# Patient Record
Sex: FEMALE | Race: WHITE | NOT HISPANIC OR LATINO
[De-identification: names, ages, dates, MRNs, and addresses within clinical notes are randomized per-mention and may not be internally consistent; named-entity substitution may affect disease eponyms.]

---

## 2019-11-29 ENCOUNTER — TRANSCRIBE ORDERS (OUTPATIENT)
Dept: OTHER | Age: 69
End: 2019-11-29

## 2019-11-29 DIAGNOSIS — R25.1 TREMOR: Primary | ICD-10-CM

## 2019-12-02 NOTE — TELEPHONE ENCOUNTER
RECORDS RECEIVED FROM: External - Dona Marion with the [facility]   DATE RECEIVED: 1/14/20   NOTES (FOR ALL VISITS) STATUS DETAILS   OFFICE NOTE from referring provider Received 6/21/16  5/10/16  3/29/16   OFFICE NOTE from other specialist Care Everywhere [facility]:  11/4/19   DISCHARGE SUMMARY from hospital N/A    DISCHARGE REPORT from the ER N/A    OPERATIVE REPORT N/A    MEDICATION LIST Care Everywhere    IMAGING  (FOR ALL VISITS)     EMG N/A    EEG N/A    ECT N/A    MRI (HEAD, NECK, SPINE) Received  [facility]:  MRI Brain 8/10/16  MRI Brain 4/29/14   LUMBAR PUNCTURE N/A    [facility] Scan N/A    CT (HEAD, NECK, SPINE) Received  [facility]:  CT Brain 6/27/13      Action 12/2/19   Action Taken Records request faxed to  [facility]

## 2019-12-03 ENCOUNTER — DOCUMENTATION ONLY (OUTPATIENT)
Dept: CARE COORDINATION | Facility: CLINIC | Age: 69
End: 2019-12-03

## 2019-12-03 NOTE — TELEPHONE ENCOUNTER
Action 12/3/19   Action Taken Records received from [facility] via fax. Sent to scanning.    Waiting for images

## 2019-12-04 NOTE — TELEPHONE ENCOUNTER
Imaging Received  12/4/19  [facility]   Image Type (x): Disc: x  PACS:    Exam Date/Name MRI Brain 8/10/16  MRI Brain 4/29/14  CT Brain 6/27/13 Comments: imaging disk received via mail. Sent to scanning

## 2020-01-03 PROBLEM — Z81.1: Status: ACTIVE | Noted: 2020-01-03

## 2020-01-03 PROBLEM — I10 HYPERTENSION: Status: ACTIVE | Noted: 2020-01-03

## 2020-01-03 PROBLEM — J45.909 ASTHMA: Status: ACTIVE | Noted: 2020-01-03

## 2020-01-03 PROBLEM — R25.1 TREMOR: Status: ACTIVE | Noted: 2020-01-03

## 2020-01-03 PROBLEM — K60.2 ANAL FISSURE: Status: ACTIVE | Noted: 2020-01-03

## 2020-01-03 PROBLEM — N80.9 ENDOMETRIOSIS: Status: ACTIVE | Noted: 2020-01-03

## 2020-01-03 PROBLEM — E11.9 DIABETES MELLITUS, TYPE 2 (H): Status: ACTIVE | Noted: 2020-01-03

## 2020-01-03 PROBLEM — A06.9: Status: ACTIVE | Noted: 2020-01-03

## 2020-01-03 PROBLEM — Z82.0 FAMILY HISTORY OF AMYOTROPHIC LATERAL SCLEROSIS: Status: ACTIVE | Noted: 2020-01-03

## 2020-01-03 RX ORDER — FUROSEMIDE 20 MG
10 TABLET ORAL DAILY
COMMUNITY
End: 2020-01-14

## 2020-01-03 RX ORDER — HYDROCHLOROTHIAZIDE 50 MG/1
TABLET ORAL
COMMUNITY
End: 2021-06-21

## 2020-01-03 RX ORDER — ALBUTEROL SULFATE 0.83 MG/ML
2.5 SOLUTION RESPIRATORY (INHALATION) 4 TIMES DAILY
COMMUNITY
End: 2020-01-14

## 2020-01-03 RX ORDER — LEVOTHYROXINE SODIUM 25 UG/1
TABLET ORAL
COMMUNITY

## 2020-01-03 RX ORDER — METOPROLOL SUCCINATE 200 MG/1
TABLET, EXTENDED RELEASE ORAL
COMMUNITY
End: 2020-01-14

## 2020-01-03 RX ORDER — ROSUVASTATIN CALCIUM 20 MG/1
TABLET, COATED ORAL
COMMUNITY
End: 2021-06-21

## 2020-01-03 RX ORDER — LISINOPRIL 20 MG/1
TABLET ORAL
COMMUNITY
End: 2021-09-23

## 2020-01-03 RX ORDER — DICYCLOMINE HYDROCHLORIDE 10 MG/1
CAPSULE ORAL
COMMUNITY
End: 2020-01-14

## 2020-01-03 NOTE — PROGRESS NOTES
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   Procedure Laterality Date     APPENDECTOMY     CHOLECYSTECTOMY     COLOSTOMY     HYSTERECTOMY     ILEOSTOMY     OOPHORECTOMY   unilateral     TONSILLECTOMY     Allergies   Allergen Reactions     Penicillin Hives     Primidone Delirium     Ampicillin Unknown Reaction     Ciprofloxacin Unknown Reaction     Latex Unknown Reaction     Sulfa (Sulfonamide Antibiotics) Unknown Reaction     Current Outpatient Medications   Medication Sig Dispense Refill     albuterol (AKA: PROVENTIL/VENTOLIN) 2.5 mg /3 mL (0.083 %) inhalation Solution for Nebulization 2.5 mg by inhalation-neb route four times daily.     blood sugar diagnostic (ACCU-CHEK INSTANT GLUCOSE TEST MISC) USE ONE STRIP FOR TESTING AS DIRECTED TWICE A DAY TO CHECK BLOOD SUGARS     cholecalciferol, Vitamin D3, 2,000 unit oral Tablet Take 2,000 Units by mouth once daily.     cyanocobalamin (AKA: VITAMIN B-12) 100 mcg oral Tablet Take 250 mcg by  mouth once daily.     dicyclomine (AKA: BENTYL) 10 mg oral Capsule Take 10 mcg by mouth five times daily as needed..     furosemide (AKA: LASIX) 10 mg oral Tablet Take 10 mg by mouth once daily.     glucose (AKA: BD-GLUCOSE) 4 gram oral Tablet, Chewable Chew and Swallow 4 mg by mouth three times daily as needed.     hydroCHLOROthiazide 50 mg oral Tablet Take 50 mg by mouth once daily.     levothyroxine (AKA: LEVOXYL, SYNTHROID) 25 mcg oral Tablet Take 25 mcg by mouth once daily.     lisinopril (AKA: PRINIVIL/ZESTRIL) 20 mg oral Tablet Take 20 mg by mouth twice daily.     metFORMIN (AKA: GLUCOPHAGE) 1,000 mg oral Tablet Take 1,000 mg by mouth twice daily.     metoprolol succinate (AKA: TOPROL XL) 200 mg oral Tablet Sustained Release 24HR Take 100 mg by mouth once daily.     rosuvastatin (CRESTOR) 40 mg oral Tablet Take 20 mg by mouth once daily.     No current facility-administered medications for this visit.     Social History     Socioeconomic History     Marital status:    Spouse name: Not on file     Number of children: Not on file     Years of education: Not on file     Highest education level: Not on file   Occupational History     Not on file   Social Needs     Financial resource strain: Not on file     Food insecurity:   Worry: Not on file   Inability: Not on file     Transportation needs:   Medical: Not on file   Non-medical: Not on file   Tobacco Use     Smoking status: Never Smoker     Smokeless tobacco: Never Used   Substance and Sexual Activity     Alcohol use: Not on file   Comment: no alcohol use     Drug use: Not on file     Sexual activity: Not on file   Lifestyle     Physical activity:   Days per week: Not on file   Minutes per session: Not on file     Stress: Not on file   Relationships     Social connections:   Talks on phone: Not on file   Gets together: Not on file   Attends Rastafarian service: Not on file   Active member of club or organization: Not on file   Attends meetings of clubs or  organizations: Not on file   Relationship status: Not on file     Intimate partner violence:   Fear of current or ex partner: Not on file   Emotionally abused: Not on file   Physically abused: Not on file   Forced sexual activity: Not on file   Other Topics Concern     Not on file   Social History Narrative     Not on file     Family History   Problem Relation Age of Onset     Other Father   amyotrophic lateral sclerosis     Other Sister   victim, motorcycle in vehicular or traffic accident     Alcohol/Drug problem Brother   alcoholism     ROS: A 12 point review of systems was completed and is pertinent for as stated in the HPI. I have nothing further to add at this time.    PHYSICAL EXAM     /76 (BP Source: L arm, BP position: Sitting)     GENERAL: Patient is awake and alert, in no acute distress, and nontoxic in appearance. Interacting appropriately and following commands.  HEENT: Normocephalic/atraumatic. There is no nuchal rigidity. Carotid upstrokes are symmetric without bruits. No masses present. No occipital notch tenderness. No tissue/texture changes in the paracervical or trapezius regions.  HEART: Regular rate and rhythm, no murmur.  LUNGS: Clear to auscultation bilaterally. No wheeze, rhonchi, rales, or stridor.  ABDOMEN: Soft, nontender and nondistended. Bowel sounds present.  EXTREMITIES: No clubbing, cyanosis, edema, or erythema evident. Distal pulses symmetrical.    NEUROLOGIC EXAM    MENTAL STATUS: The patient is awake, alert, and is oriented to all spheres. There is intact recent and remote memory. Registration is 3/3, recall is 3/3. Speech is fluent. There is no dysarthria, dysphasia or anomia. Fund of knowledge is adequate, and insight is intact.    CRANIAL NERVES: Fundi are difficult to visualize at this time. Visual fields are full to confrontation. Visual acuity is adequate bilaterally. Pupils are equal, round and briskly reactive to light. Extraocular movements are intact. There is no  nystagmus or gaze preference. Facial sensation is intact in all 3 distributions bilaterally. Muscles of mastication are full. There is no facial asymmetry. Hearing is intact to soft conversation. Soft palate elevation is symmetric. SCM and trapezius strength are full at 5/5 bilaterally. Tongue is midline without deviation or evidence of atrophy.    SENSATION: There is intact sensation to vibration, position and light touch in bilateral upper and lower extremities. There is no extinction.    MOTOR: There is no pronator drift. Strength in upper and lower extremities is full both proximally and distally at a 5/5. There is normal bulk and tone without atrophy. There is no clonus, rigidity or spasticity. There is a postural and kinetic tremor of the bilateral hands certainly right greater than left. The right hand there is a tremor at rest as well is fairly high amplitude. There is some rigidity on the right hand at the wrist although this may be secondary to the tremor.. There is no Lashaun sign.    CEREBELLAR: There is intact finger-nose-finger and rapid alternating movements. There is no dysmetria or dysdiadochokinesia.    REFLEXES: Deep tendon reflexes are symmetric in bilateral upper and lower extremities, graded 2/4.     GAIT: Gait is steady.    SUGAR Newton is a 69 Y female seen in a neurological evaluation regarding her history of tremors and questionable Parkinson's. Sounds as if she may have some REM sleep related issues but otherwise she has no other coexisting Parkinson's symptoms or signs. She does have a tremor both postural kinetic and at rest of the right upper extremity as well as the jaw.    PLAN  Discussed the above with her today in the office. To be certainly worthwhile to try her on Sinemet to see if there is any improvement however I really think that the best course of action would be to have her be evaluated by a movement specialist at the [facility]. She is agreeable to this  however will [facility] and therefore I am making these recommendations. I am hopeful that the [facility] will make this authorization and arrangement for her. She is agreeable to this.    Thank you for allowing me to participate in the care of your patient. Please do not hesitate to contact me with any questions or concerns.    1. Tremors of nervous system     No orders of the defined types were placed in this encounter.      CC: SHERICE Mike,CNP    Electronically signed by Sobia Villegas DO at 11/04/2019 10:41 AM CST

## 2020-01-14 ENCOUNTER — PRE VISIT (OUTPATIENT)
Dept: NEUROLOGY | Facility: CLINIC | Age: 70
End: 2020-01-14

## 2020-01-14 ENCOUNTER — OFFICE VISIT (OUTPATIENT)
Dept: NEUROLOGY | Facility: CLINIC | Age: 70
End: 2020-01-14
Attending: NURSE PRACTITIONER
Payer: COMMERCIAL

## 2020-01-14 VITALS
HEART RATE: 88 BPM | WEIGHT: 213.7 LBS | SYSTOLIC BLOOD PRESSURE: 147 MMHG | OXYGEN SATURATION: 97 % | DIASTOLIC BLOOD PRESSURE: 76 MMHG

## 2020-01-14 DIAGNOSIS — Z81.1: ICD-10-CM

## 2020-01-14 DIAGNOSIS — I10 ESSENTIAL HYPERTENSION: ICD-10-CM

## 2020-01-14 DIAGNOSIS — A06.9: ICD-10-CM

## 2020-01-14 DIAGNOSIS — N80.9 ENDOMETRIOSIS: ICD-10-CM

## 2020-01-14 DIAGNOSIS — R25.1 TREMOR: ICD-10-CM

## 2020-01-14 DIAGNOSIS — Z87.820 HISTORY OF CONCUSSION: ICD-10-CM

## 2020-01-14 DIAGNOSIS — Z82.0 FAMILY HISTORY OF AMYOTROPHIC LATERAL SCLEROSIS: ICD-10-CM

## 2020-01-14 DIAGNOSIS — K60.2 ANAL FISSURE: ICD-10-CM
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
## 2020-01-14 RX ORDER — GLIPIZIDE 10 MG/1
TABLET ORAL
COMMUNITY
End: 2021-06-21

## 2020-01-14 RX ORDER — LORATADINE 10 MG/1
TABLET ORAL
COMMUNITY
End: 2021-06-17

## 2020-01-14 RX ORDER — METOPROLOL SUCCINATE 50 MG/1
TABLET, EXTENDED RELEASE ORAL
COMMUNITY
End: 2021-06-21

## 2020-01-14 RX ORDER — DULOXETIN HYDROCHLORIDE 60 MG/1
CAPSULE, DELAYED RELEASE ORAL
COMMUNITY
End: 2021-06-17

## 2020-01-14 RX ORDER — GUAIFENESIN 400 MG/1
TABLET ORAL
COMMUNITY
End: 2021-06-17

## 2020-01-14 RX ORDER — TRAZODONE HYDROCHLORIDE 50 MG/1
TABLET, FILM COATED ORAL
COMMUNITY
End: 2021-06-16

## 2020-01-14 RX ORDER — INSULIN GLARGINE 100 [IU]/ML
INJECTION, SOLUTION SUBCUTANEOUS
COMMUNITY
End: 2021-06-17

## 2020-01-14 RX ORDER — ALBUTEROL SULFATE 0.83 MG/ML
SOLUTION RESPIRATORY (INHALATION)
COMMUNITY
Start: 2020-01-14 | End: 2021-09-09

## 2020-01-14 RX ORDER — FUROSEMIDE 20 MG
TABLET ORAL
COMMUNITY
Start: 2020-01-14 | End: 2021-06-16

## 2020-01-14 ASSESSMENT — ENCOUNTER SYMPTOMS
DISTURBANCES IN COORDINATION: 0
NUMBNESS: 0
TINGLING: 0
LOSS OF CONSCIOUSNESS: 0
HEADACHES: 0
DIZZINESS: 0
PARALYSIS: 0
TREMORS: 1
MEMORY LOSS: 0
WEAKNESS: 0
SPEECH CHANGE: 0
SEIZURES: 0

## 2020-01-14 ASSESSMENT — PAIN SCALES - GENERAL: PAINLEVEL: NO PAIN (0)

## 2020-01-14 NOTE — NURSING NOTE
Chief Complaint   Patient presents with     Consult For     P NEW MOVEMENT DISORDER - TREMORS      Tremors     Nicole Cortez, EMT
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
## 2020-01-14 NOTE — LETTER
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     History of anal fissures     History of diabetes mellitus, type II     History of hypertension     Personal history of asthma     Personal history of endometriosis     Tremor, essential     Past Surgical History:   Procedure Laterality Date     APPENDECTOMY     CHOLECYSTECTOMY     COLOSTOMY     HYSTERECTOMY     ILEOSTOMY     OOPHORECTOMY   unilateral     TONSILLECTOMY     Allergies   Allergen Reactions     Penicillin Hives     Primidone Delirium     Ampicillin Unknown Reaction     Ciprofloxacin Unknown Reaction     Latex Unknown Reaction     Sulfa (Sulfonamide Antibiotics) Unknown Reaction     Current Outpatient Medications   Medication Sig Dispense Refill     albuterol (AKA: PROVENTIL/VENTOLIN) 2.5 mg /3 mL (0.083 %) inhalation Solution for Nebulization 2.5 mg by inhalation-neb route four times daily.     blood sugar diagnostic (ACCU-CHEK INSTANT GLUCOSE  TEST MISC) USE ONE STRIP FOR TESTING AS DIRECTED TWICE A DAY TO CHECK BLOOD SUGARS     cholecalciferol, Vitamin D3, 2,000 unit oral Tablet Take 2,000 Units by mouth once daily.     cyanocobalamin (AKA: VITAMIN B-12) 100 mcg oral Tablet Take 250 mcg by mouth once daily.     dicyclomine (AKA: BENTYL) 10 mg oral Capsule Take 10 mcg by mouth five times daily as needed..     furosemide (AKA: LASIX) 10 mg oral Tablet Take 10 mg by mouth once daily.     glucose (AKA: BD-GLUCOSE) 4 gram oral Tablet, Chewable Chew and Swallow 4 mg by mouth three times daily as needed.     hydroCHLOROthiazide 50 mg oral Tablet Take 50 mg by mouth once daily.     levothyroxine (AKA: LEVOXYL, SYNTHROID) 25 mcg oral Tablet Take 25 mcg by mouth once daily.     lisinopril (AKA: PRINIVIL/ZESTRIL) 20 mg oral Tablet Take 20 mg by mouth twice daily.     metFORMIN (AKA: GLUCOPHAGE) 1,000 mg oral Tablet Take 1,000 mg by mouth twice daily.     metoprolol succinate (AKA: TOPROL XL) 200 mg oral Tablet Sustained Release 24HR Take 100 mg by mouth once daily.     rosuvastatin (CRESTOR) 40 mg oral Tablet Take 20 mg by mouth once daily.     No current facility-administered medications for this visit.     Social History     Socioeconomic History     Marital status:    Spouse name: Not on file     Number of children: Not on file     Years of education: Not on file     Highest education level: Not on file   Occupational History     Not on file   Social Needs     Financial resource strain: Not on file     Food insecurity:   Worry: Not on file   Inability: Not on file     Transportation needs:   Medical: Not on file   Non-medical: Not on file   Tobacco Use     Smoking status: Never Smoker     Smokeless tobacco: Never Used   Substance and Sexual Activity     Alcohol use: Not on file   Comment: no alcohol use     Drug use: Not on file     Sexual activity: Not on file   Lifestyle     Physical activity:   Days per week: Not on file   Minutes per session: Not on  file     Stress: Not on file   Relationships     Social connections:   Talks on phone: Not on file   Gets together: Not on file   Attends Taoism service: Not on file   Active member of club or organization: Not on file   Attends meetings of clubs or organizations: Not on file   Relationship status: Not on file     Intimate partner violence:   Fear of current or ex partner: Not on file   Emotionally abused: Not on file   Physically abused: Not on file   Forced sexual activity: Not on file   Other Topics Concern     Not on file   Social History Narrative     Not on file     Family History   Problem Relation Age of Onset     Other Father   amyotrophic lateral sclerosis     Other Sister   victim, motorcycle in vehicular or traffic accident     Alcohol/Drug problem Brother   alcoholism     ROS: A 12 point review of systems was completed and is pertinent for as stated in the HPI. I have nothing further to add at this time.    PHYSICAL EXAM     /76 (BP Source: L arm, BP position: Sitting)     GENERAL: Patient is awake and alert, in no acute distress, and nontoxic in appearance. Interacting appropriately and following commands.  HEENT: Normocephalic/atraumatic. There is no nuchal rigidity. Carotid upstrokes are symmetric without bruits. No masses present. No occipital notch tenderness. No tissue/texture changes in the paracervical or trapezius regions.  HEART: Regular rate and rhythm, no murmur.  LUNGS: Clear to auscultation bilaterally. No wheeze, rhonchi, rales, or stridor.  ABDOMEN: Soft, nontender and nondistended. Bowel sounds present.  EXTREMITIES: No clubbing, cyanosis, edema, or erythema evident. Distal pulses symmetrical.    NEUROLOGIC EXAM    MENTAL STATUS: The patient is awake, alert, and is oriented to all spheres. There is intact recent and remote memory. Registration is 3/3, recall is 3/3. Speech is fluent. There is no dysarthria, dysphasia or anomia. Fund of knowledge is adequate, and insight is  intact.    CRANIAL NERVES: Fundi are difficult to visualize at this time. Visual fields are full to confrontation. Visual acuity is adequate bilaterally. Pupils are equal, round and briskly reactive to light. Extraocular movements are intact. There is no nystagmus or gaze preference. Facial sensation is intact in all 3 distributions bilaterally. Muscles of mastication are full. There is no facial asymmetry. Hearing is intact to soft conversation. Soft palate elevation is symmetric. SCM and trapezius strength are full at 5/5 bilaterally. Tongue is midline without deviation or evidence of atrophy.    SENSATION: There is intact sensation to vibration, position and light touch in bilateral upper and lower extremities. There is no extinction.    MOTOR: There is no pronator drift. Strength in upper and lower extremities is full both proximally and distally at a 5/5. There is normal bulk and tone without atrophy. There is no clonus, rigidity or spasticity. There is a postural and kinetic tremor of the bilateral hands certainly right greater than left. The right hand there is a tremor at rest as well is fairly high amplitude. There is some rigidity on the right hand at the wrist although this may be secondary to the tremor.. There is no Lashaun sign.    CEREBELLAR: There is intact finger-nose-finger and rapid alternating movements. There is no dysmetria or dysdiadochokinesia.    REFLEXES: Deep tendon reflexes are symmetric in bilateral upper and lower extremities, graded 2/4.     GAIT: Gait is steady.    SUGAR Newton is a 69 Y female seen in a neurological evaluation regarding her history of tremors and questionable Parkinson's. Sounds as if she may have some REM sleep related issues but otherwise she has no other coexisting Parkinson's symptoms or signs. She does have a tremor both postural kinetic and at rest of the right upper extremity as well as the jaw.    PLAN  Discussed the above with her today in the  office. To be certainly worthwhile to try her on Sinemet to see if there is any improvement however I really think that the best course of action would be to have her be evaluated by a movement specialist at the [facility]. She is agreeable to this however will [facility] and therefore I am making these recommendations. I am hopeful that the [facility] will make this authorization and arrangement for her. She is agreeable to this.    Thank you for allowing me to participate in the care of your patient. Please do not hesitate to contact me with any questions or concerns.    1. Tremors of nervous system     No orders of the defined types were placed in this encounter.      CC: SHERICE Mike,CNP    Electronically signed by Sobia Villegas DO at 11/04/2019 10:41 AM CST        Again, thank you for allowing me to participate in the care of your patient.      Sincerely,    Eric Lee MD

## 2020-03-15 ENCOUNTER — HEALTH MAINTENANCE LETTER (OUTPATIENT)
Age: 70
End: 2020-03-15

## 2021-01-15 ENCOUNTER — HEALTH MAINTENANCE LETTER (OUTPATIENT)
Age: 71
End: 2021-01-15

## 2021-03-15 ENCOUNTER — TRANSFERRED RECORDS (OUTPATIENT)
Dept: HEALTH INFORMATION MANAGEMENT | Facility: CLINIC | Age: 71
End: 2021-03-15

## 2021-05-04 ENCOUNTER — MEDICAL CORRESPONDENCE (OUTPATIENT)
Dept: HEALTH INFORMATION MANAGEMENT | Facility: CLINIC | Age: 71
End: 2021-05-04

## 2021-05-06 ENCOUNTER — TRANSCRIBE ORDERS (OUTPATIENT)
Dept: OTHER | Age: 71
End: 2021-05-06

## 2021-05-06 DIAGNOSIS — G25.0 ESSENTIAL TREMOR: Primary | ICD-10-CM

## 2021-05-09 ENCOUNTER — HEALTH MAINTENANCE LETTER (OUTPATIENT)
Age: 71
End: 2021-05-09

## 2021-05-31 PROBLEM — G25.0 ESSENTIAL TREMOR: Status: ACTIVE | Noted: 2021-03-15

## 2021-05-31 PROBLEM — G62.9 NEUROPATHY: Status: ACTIVE | Noted: 2021-03-15

## 2021-05-31 PROBLEM — E11.9 TYPE II DIABETES MELLITUS (H): Status: ACTIVE | Noted: 2021-03-15

## 2021-05-31 PROBLEM — E03.9 HYPOTHYROIDISM: Status: ACTIVE | Noted: 2021-03-15
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
## 2021-06-09 ENCOUNTER — DOCUMENTATION ONLY (OUTPATIENT)
Dept: NEUROLOGY | Facility: CLINIC | Age: 71
End: 2021-06-09

## 2021-06-09 NOTE — PROGRESS NOTES
[facility] was completed by provider and emailed to Sarah Oliva, and sent to be scanned into patient chart

## 2021-06-17 ENCOUNTER — OFFICE VISIT (OUTPATIENT)
Dept: NEUROLOGY | Facility: CLINIC | Age: 71
End: 2021-06-17
Payer: COMMERCIAL

## 2021-06-17 VITALS
SYSTOLIC BLOOD PRESSURE: 175 MMHG | RESPIRATION RATE: 16 BRPM | HEART RATE: 71 BPM | DIASTOLIC BLOOD PRESSURE: 84 MMHG | WEIGHT: 219 LBS | OXYGEN SATURATION: 96 %

## 2021-06-17 DIAGNOSIS — G20.A1 PARKINSON DISEASE (H): ICD-10-CM

## 2021-06-17 DIAGNOSIS — R25.1 TREMOR: Primary | ICD-10-CM

## 2021-06-17 PROBLEM — Z98.890 HISTORY OF SURGICAL PROCEDURE: Status: ACTIVE | Noted: 2021-06-17

## 2021-06-17 PROBLEM — M79.643 PAIN OF HAND: Status: ACTIVE | Noted: 2021-06-17

## 2021-06-17 PROBLEM — R10.9 ABDOMINAL PAIN: Status: ACTIVE | Noted: 2021-06-17

## 2021-06-17 PROBLEM — M25.562 ARTHRALGIA OF BOTH KNEES: Status: ACTIVE | Noted: 2021-06-17

## 2021-06-17 PROBLEM — K63.5 POLYP OF COLON: Status: ACTIVE | Noted: 2021-06-17

## 2021-06-17 PROBLEM — K21.9 GASTROESOPHAGEAL REFLUX DISEASE WITH HIATAL HERNIA: Status: ACTIVE | Noted: 2021-06-17

## 2021-06-17 PROBLEM — M54.2 NECK PAIN: Status: ACTIVE | Noted: 2021-06-17

## 2021-06-17 PROBLEM — I82.409 ACUTE THROMBOEMBOLISM OF DEEP VEINS OF LOWER EXTREMITY (H): Status: ACTIVE | Noted: 2021-06-17

## 2021-06-17 PROBLEM — E78.5 HYPERLIPIDEMIA: Status: ACTIVE | Noted: 2021-06-17

## 2021-06-17 PROBLEM — R01.1 UNDIAGNOSED CARDIAC MURMURS: Status: ACTIVE | Noted: 2021-06-17

## 2021-06-17 PROBLEM — H25.10 NUCLEAR SENILE CATARACT: Status: ACTIVE | Noted: 2021-06-17

## 2021-06-17 PROBLEM — K57.90 DIVERTICULAR DISEASE: Status: ACTIVE | Noted: 2021-06-17

## 2021-06-17 PROBLEM — N95.1 SYMPTOMATIC MENOPAUSAL OR FEMALE CLIMACTERIC STATES: Status: ACTIVE | Noted: 2021-06-17

## 2021-06-17 PROBLEM — R07.89 ATYPICAL CHEST PAIN: Status: ACTIVE | Noted: 2021-06-17

## 2021-06-17 PROBLEM — N85.9 OTHER SPECIFIED DISORDERS OF UTERUS: Status: ACTIVE | Noted: 2021-06-17

## 2021-06-17 PROBLEM — R42 DIZZINESS AND GIDDINESS: Status: ACTIVE | Noted: 2021-06-17

## 2021-06-17 PROBLEM — F43.12 CHRONIC POST-TRAUMATIC STRESS DISORDER (PTSD): Status: ACTIVE | Noted: 2021-06-17

## 2021-06-17 PROBLEM — R59.1 LYMPHADENOPATHY: Status: ACTIVE | Noted: 2021-06-17

## 2021-06-17 PROBLEM — Z80.3 FAMILY HISTORY OF MALIGNANT NEOPLASM OF BREAST: Status: ACTIVE | Noted: 2021-06-17

## 2021-06-17 PROBLEM — M85.80 OSTEOPENIA: Status: ACTIVE | Noted: 2021-06-17

## 2021-06-17 PROBLEM — E78.5 DYSLIPIDEMIA: Status: ACTIVE | Noted: 2021-06-17

## 2021-06-17 PROBLEM — D24.9 BENIGN NEOPLASM OF BREAST: Status: ACTIVE | Noted: 2021-06-17

## 2021-06-17 PROBLEM — Z79.01 LONG TERM CURRENT USE OF ANTICOAGULANT THERAPY: Status: ACTIVE | Noted: 2021-06-17

## 2021-06-17 PROBLEM — D12.6 BENIGN NEOPLASM OF COLON: Status: ACTIVE | Noted: 2021-06-17

## 2021-06-17 PROBLEM — M54.50 LOW BACK PAIN: Status: ACTIVE | Noted: 2021-06-17

## 2021-06-17 PROBLEM — E87.6 HYPOKALEMIA: Status: ACTIVE | Noted: 2021-06-17

## 2021-06-17 PROBLEM — K44.9 GASTROESOPHAGEAL REFLUX DISEASE WITH HIATAL HERNIA: Status: ACTIVE | Noted: 2021-06-17

## 2021-06-17 PROBLEM — M25.561 ARTHRALGIA OF BOTH KNEES: Status: ACTIVE | Noted: 2021-06-17

## 2021-06-17 PROBLEM — R01.1 SYSTOLIC MURMUR: Status: ACTIVE | Noted: 2021-06-17

## 2021-06-17 PROBLEM — D68.59 PROTEIN S DEFICIENCY (H): Status: ACTIVE | Noted: 2021-06-17

## 2021-06-17 PROBLEM — H04.123 DRY EYES: Status: ACTIVE | Noted: 2021-06-17

## 2021-06-17 PROBLEM — G47.00 INSOMNIA: Status: ACTIVE | Noted: 2021-06-17

## 2021-06-17 PROBLEM — H43.819 POSTERIOR VITREOUS DETACHMENT: Status: ACTIVE | Noted: 2021-06-17

## 2021-06-17 PROBLEM — K57.30 DIVERTICULOSIS OF COLON: Status: ACTIVE | Noted: 2021-06-17

## 2021-06-17 PROBLEM — Z51.81 ENCOUNTER FOR THERAPEUTIC DRUG MONITORING: Status: ACTIVE | Noted: 2021-06-17

## 2021-06-17 PROBLEM — R60.0 EDEMA OF LOWER EXTREMITY: Status: ACTIVE | Noted: 2021-06-17

## 2021-06-17 PROBLEM — H93.19 TINNITUS: Status: ACTIVE | Noted: 2021-06-17

## 2021-06-17 PROBLEM — I26.99 OTHER PULMONARY EMBOLISM AND INFARCTION: Status: ACTIVE | Noted: 2021-06-17

## 2021-06-17 PROBLEM — N39.3 STRESS INCONTINENCE IN FEMALE: Status: ACTIVE | Noted: 2021-06-17

## 2021-06-17 PROBLEM — H90.3 SENSORINEURAL HEARING LOSS (SNHL) OF BOTH EARS: Status: ACTIVE | Noted: 2021-06-17

## 2021-06-17 PROBLEM — E83.42 HYPOMAGNESEMIA: Status: ACTIVE | Noted: 2021-06-17

## 2021-06-17 PROBLEM — L65.9 BALDING: Status: ACTIVE | Noted: 2021-06-17

## 2021-06-17 PROBLEM — M19.90 LOCALIZED OSTEOARTHROSIS: Status: ACTIVE | Noted: 2021-06-17

## 2021-06-17 PROBLEM — R26.81 UNSTEADY GAIT WHEN WALKING: Status: ACTIVE | Noted: 2021-06-17

## 2021-06-17 PROBLEM — N30.20 OTHER CHRONIC CYSTITIS: Status: ACTIVE | Noted: 2021-06-17

## 2021-06-17 PROBLEM — M48.02 SPINAL STENOSIS OF CERVICAL REGION: Status: ACTIVE | Noted: 2021-06-17

## 2021-06-17 PROBLEM — N95.1 MENOPAUSAL SYNDROME: Status: ACTIVE | Noted: 2021-06-17

## 2021-06-17 PROBLEM — F33.9 RECURRENT DEPRESSIVE DISORDER (H): Status: ACTIVE | Noted: 2021-06-17

## 2021-06-17 PROCEDURE — 99215 OFFICE O/P EST HI 40 MIN: CPT | Performed by: PSYCHIATRY & NEUROLOGY

## 2021-06-17 RX ORDER — POTASSIUM CHLORIDE 1.5 G/1.58G
20 POWDER, FOR SOLUTION ORAL
COMMUNITY
Start: 2020-05-08 | End: 2021-06-17

## 2021-06-17 RX ORDER — TRAZODONE HYDROCHLORIDE 50 MG/1
50 TABLET, FILM COATED ORAL
COMMUNITY
Start: 2020-05-15 | End: 2021-06-17

## 2021-06-17 RX ORDER — NITROGLYCERIN 0.4 MG/1
0.4 TABLET SUBLINGUAL
COMMUNITY
Start: 2020-08-20 | End: 2021-06-17

## 2021-06-17 RX ORDER — MIRTAZAPINE 15 MG/1
7.5 TABLET, FILM COATED ORAL
COMMUNITY
Start: 2020-09-03 | End: 2021-06-17

## 2021-06-17 RX ORDER — HYDROCHLOROTHIAZIDE 25 MG/1
TABLET ORAL
COMMUNITY
Start: 2021-05-14 | End: 2021-06-17

## 2021-06-17 RX ORDER — TRAZODONE HYDROCHLORIDE 50 MG/1
TABLET, FILM COATED ORAL
COMMUNITY
Start: 2021-06-17 | End: 2021-06-17

## 2021-06-17 RX ORDER — ROSUVASTATIN CALCIUM 20 MG/1
20 TABLET, COATED ORAL
COMMUNITY
Start: 2020-05-08 | End: 2021-06-17

## 2021-06-17 RX ORDER — LORATADINE 10 MG/1
TABLET ORAL
COMMUNITY
Start: 2021-06-17

## 2021-06-17 RX ORDER — METOPROLOL SUCCINATE 100 MG/1
TABLET, EXTENDED RELEASE ORAL
COMMUNITY
Start: 2020-09-08

## 2021-06-17 RX ORDER — POLYETHYLENE GLYCOL 3350 17 G/17G
POWDER, FOR SOLUTION ORAL
COMMUNITY
Start: 2020-06-03 | End: 2021-06-17

## 2021-06-17 RX ORDER — AMLODIPINE BESYLATE 5 MG/1
TABLET ORAL
COMMUNITY
Start: 2021-04-06

## 2021-06-17 RX ORDER — LISINOPRIL 20 MG/1
20 TABLET ORAL
COMMUNITY
Start: 2020-05-08 | End: 2021-06-17

## 2021-06-17 RX ORDER — INSULIN GLARGINE 100 [IU]/ML
INJECTION, SOLUTION SUBCUTANEOUS
COMMUNITY
Start: 2021-06-17

## 2021-06-17 RX ORDER — LORATADINE 10 MG/1
10 TABLET ORAL
COMMUNITY
Start: 2020-05-08 | End: 2021-06-17

## 2021-06-17 RX ORDER — HYDROCHLOROTHIAZIDE 25 MG/1
25 TABLET ORAL
COMMUNITY
Start: 2020-06-03 | End: 2021-06-17

## 2021-06-17 RX ORDER — POTASSIUM CHLORIDE 1500 MG/1
TABLET, EXTENDED RELEASE ORAL
COMMUNITY
Start: 2021-04-26

## 2021-06-17 RX ORDER — GLIPIZIDE 10 MG/1
10 TABLET ORAL
COMMUNITY
Start: 2020-06-03 | End: 2021-06-17
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
## 2021-06-17 NOTE — NURSING NOTE
Chief Complaint   Patient presents with     Tremors     [employer] RETURN MOVEMENT DISORDER       Ruben Amaya, EMT

## 2021-06-17 NOTE — NURSING NOTE
Spoke to patient about her blood pressure. She stated her blood pressure has been trending upward over the last year. She takes her blood pressure once a week at home. She is on 3 medications for it and will speak with her primary care provider soon.     her   at 5:30 am [birth year] her birthday.    Goals for visit:    1. Dr. Lee is an expert. She wants to know about non invasive FUS. She can't button a button, bee, knit, plug a light into an outlet, cannot sew and she loved to sew. [facility] , [facility] scheduled 8[facility]10/21. Educated her about what the daTscan will show. [facility]. She has no family so after care would be a problem. No one to take care of her. Labs on .    2. [facility] would not do Deep Brain Stimulation due to her DM2. It has been over a year since last A1C. Her numbers are good now. They said she would have problems healing.    What Is Focused Ultrasound?  Focused ultrasound is a non-invasive surgical procedure that uses ultrasound waves to destroy brain cells that cause movement problems. (It's a bit like using a magnifying glass to focus sunlight rays on a leaf to make a tiny hole.) The targeted brain cells are part of the basal ganglia, the circuit that controls normal movement and is affected in Parkinson's.

## 2021-06-18 ENCOUNTER — TELEPHONE (OUTPATIENT)
Dept: NEUROLOGY | Facility: CLINIC | Age: 71
End: 2021-06-18

## 2021-06-18 NOTE — TELEPHONE ENCOUNTER
Albuteral 90 mcg as needed   Albuteral SO4 0.083% 3ml vial as needed - nebulizer  Amlodipine 5 mg 1 tab every day at 9:00 am  OTC - AZO - cranberry urinary tract health  mg 1 in the am and 1 in the pm  D3  50 mcg 1 tab every am  Cyanocobalimine 250 mcg 1 tab every am  Glipizide 10 mg 1 tab qday in the am  Glucose tabs as needed for low blood sugar  Guafenison - 400 mg 1 tab in the am and 1 tab at bedtime  Hydrochlorothiazide - 25 mg 1 tab every am (was decreased)  Insulin - glargine injection - 51 units in the am and 51 units pm  Levothyroxine 25 mcg - 1 every am  Lisinopril 20 mg - 1 every am and 1 every pm  Loratadine 10 mg - 1 every pm  Magnesium oxide 420 mg - 1 in the am and 1 in the pm  Metformin 1000 mg - 1 in the am and 1 in the pm  Metoprolol 100 mg - 1 in the am and 1 in the pm  OTC - MVI - Hair, skin and nails  1 in the am and 1 in the pm  Potassium 20 meq - 1 in the am and 1 in the pm  Propylene Glycol - Sustain eye drops - .4%  Rosuvastatin calcium 20 mg 1 in the pm        She endorsed episodes with suicidal depression - she is not on any medication for this. She does not want to go with anything invasive.     I told her about the Fransisca trio watch and I will send a MyChart with the information.

## 2021-06-21 DIAGNOSIS — E63.9 DIETARY DEFICIENCY: ICD-10-CM

## 2021-06-21 DIAGNOSIS — I10 HYPERTENSION: ICD-10-CM

## 2021-06-21 DIAGNOSIS — E11.9 DIABETES MELLITUS, TYPE 2 (H): ICD-10-CM

## 2021-06-21 DIAGNOSIS — E78.5 DYSLIPIDEMIA: Primary | ICD-10-CM

## 2021-06-21 RX ORDER — ROSUVASTATIN CALCIUM 20 MG/1
TABLET, COATED ORAL
Qty: 30 TABLET | Refills: 0 | COMMUNITY
Start: 2021-06-21

## 2021-06-21 RX ORDER — HYDROCHLOROTHIAZIDE 25 MG/1
TABLET ORAL
Qty: 30 TABLET | Refills: 0 | COMMUNITY
Start: 2021-06-21 | End: 2021-09-23

## 2021-06-21 RX ORDER — MAGNESIUM OXIDE 240 MG
420 POWDER IN PACKET (EA) ORAL
Qty: 60 EACH | Refills: 0 | COMMUNITY
Start: 2021-06-21 | End: 2021-09-23

## 2021-06-21 RX ORDER — GLIPIZIDE 10 MG/1
TABLET ORAL
Qty: 30 TABLET | Refills: 0 | COMMUNITY
Start: 2021-06-21

## 2021-07-09 ENCOUNTER — TRANSFERRED RECORDS (OUTPATIENT)
Dept: HEALTH INFORMATION MANAGEMENT | Facility: CLINIC | Age: 71
End: 2021-07-09

## 2021-08-02 ENCOUNTER — TELEPHONE (OUTPATIENT)
Dept: PEDIATRICS | Facility: CLINIC | Age: 71
End: 2021-08-02

## 2021-08-10 ENCOUNTER — ANCILLARY PROCEDURE (OUTPATIENT)
Dept: NUCLEAR MEDICINE | Facility: CLINIC | Age: 71
End: 2021-08-10
Attending: PSYCHIATRY & NEUROLOGY
Payer: COMMERCIAL

## 2021-08-10 DIAGNOSIS — G20.A1 PARKINSON DISEASE (H): ICD-10-CM

## 2021-08-10 PROCEDURE — A9584 IODINE I-123 IOFLUPANE: HCPCS | Performed by: STUDENT IN AN ORGANIZED HEALTH CARE EDUCATION/TRAINING PROGRAM

## 2021-08-10 PROCEDURE — 78803 RP LOCLZJ TUM SPECT 1 AREA: CPT | Performed by: STUDENT IN AN ORGANIZED HEALTH CARE EDUCATION/TRAINING PROGRAM

## 2021-08-29 ENCOUNTER — HEALTH MAINTENANCE LETTER (OUTPATIENT)
Age: 71
End: 2021-08-29

## 2021-09-09 RX ORDER — ALBUTEROL SULFATE 90 UG/1
AEROSOL, METERED RESPIRATORY (INHALATION)
COMMUNITY
Start: 2021-07-12

## 2021-09-09 RX ORDER — MAGNESIUM OXIDE 420 MG/1
TABLET ORAL
COMMUNITY
Start: 2021-07-12 | End: 2021-09-23

## 2021-09-09 RX ORDER — NITROGLYCERIN 0.4 MG/1
TABLET SUBLINGUAL
COMMUNITY
Start: 2021-07-12

## 2021-09-09 RX ORDER — POLYETHYLENE GLYCOL 3350 17 G/17G
POWDER, FOR SOLUTION ORAL
COMMUNITY
Start: 2021-07-12

## 2021-09-09 RX ORDER — ALBUTEROL SULFATE 0.83 MG/ML
SOLUTION RESPIRATORY (INHALATION)
COMMUNITY
Start: 2021-07-12
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
## 2021-09-23 ENCOUNTER — TELEPHONE (OUTPATIENT)
Dept: NEUROLOGY | Facility: CLINIC | Age: 71
End: 2021-09-23

## 2021-09-23 ENCOUNTER — OFFICE VISIT (OUTPATIENT)
Dept: NEUROLOGY | Facility: CLINIC | Age: 71
End: 2021-09-23
Payer: COMMERCIAL

## 2021-09-23 VITALS
SYSTOLIC BLOOD PRESSURE: 152 MMHG | OXYGEN SATURATION: 97 % | DIASTOLIC BLOOD PRESSURE: 78 MMHG | HEART RATE: 72 BPM | RESPIRATION RATE: 16 BRPM

## 2021-09-23 DIAGNOSIS — G20.A1 PARKINSON DISEASE (H): Primary | ICD-10-CM

## 2021-09-23 PROCEDURE — 99214 OFFICE O/P EST MOD 30 MIN: CPT | Performed by: PSYCHIATRY & NEUROLOGY

## 2021-09-23 RX ORDER — MAGNESIUM OXIDE 420 MG/1
TABLET ORAL
COMMUNITY
Start: 2021-09-23

## 2021-09-23 RX ORDER — LISINOPRIL AND HYDROCHLOROTHIAZIDE 20; 25 MG/1; MG/1
1 TABLET ORAL EVERY EVENING
COMMUNITY

## 2021-09-23 RX ORDER — GUAIFENESIN 400 MG/1
400 TABLET ORAL 2 TIMES DAILY
COMMUNITY

## 2021-09-23 ASSESSMENT — PAIN SCALES - GENERAL: PAINLEVEL: NO PAIN (0)
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
## 2021-11-04 ENCOUNTER — TELEPHONE (OUTPATIENT)
Dept: NEUROLOGY | Facility: CLINIC | Age: 71
End: 2021-11-04

## 2021-11-04 NOTE — TELEPHONE ENCOUNTER
[facility] Call Center    Phone Message    May a detailed message be left on voicemail: yes     Reason for Call: [facility]a Trip [facility] be sent to them with approval form so they can process approval for the patient. Stated [facility] faxed on 10/8 but nothing has been received.   Please contact [facility] to follow up.     Avail between 9-3 Mon-Fri at [phone]    If this documentation *was* received by clinic but never sent please [facility]ax to [facility] # [phone]    Action Taken: Message routed to:  [facility] NEUROLOGY    Travel Screening: Not Applicable

## 2021-12-19 ENCOUNTER — HEALTH MAINTENANCE LETTER (OUTPATIENT)
Age: 71
End: 2021-12-19

## 2022-04-10 ENCOUNTER — HEALTH MAINTENANCE LETTER (OUTPATIENT)
Age: 72
End: 2022-04-10

## 2022-06-05 ENCOUNTER — HEALTH MAINTENANCE LETTER (OUTPATIENT)
Age: 72
End: 2022-06-05

## 2022-07-31 ENCOUNTER — HEALTH MAINTENANCE LETTER (OUTPATIENT)
Age: 72
End: 2022-07-31

## 2022-10-15 ENCOUNTER — HEALTH MAINTENANCE LETTER (OUTPATIENT)
Age: 72
End: 2022-10-15

## 2022-12-03 ENCOUNTER — HEALTH MAINTENANCE LETTER (OUTPATIENT)
Age: 72
End: 2022-12-03

## 2023-03-26 ENCOUNTER — HEALTH MAINTENANCE LETTER (OUTPATIENT)
Age: 73
End: 2023-03-26

## 2023-06-11 ENCOUNTER — HEALTH MAINTENANCE LETTER (OUTPATIENT)
Age: 73
End: 2023-06-11

## 2023-08-20 ENCOUNTER — HEALTH MAINTENANCE LETTER (OUTPATIENT)
Age: 73
End: 2023-08-20

## 2024-01-07 ENCOUNTER — HEALTH MAINTENANCE LETTER (OUTPATIENT)
Age: 74
End: 2024-01-07

## 2024-05-26 ENCOUNTER — HEALTH MAINTENANCE LETTER (OUTPATIENT)
Age: 74
End: 2024-05-26

## 2024-08-04 ENCOUNTER — HEALTH MAINTENANCE LETTER (OUTPATIENT)
Age: 74
End: 2024-08-04

## 2024-10-13 ENCOUNTER — HEALTH MAINTENANCE LETTER (OUTPATIENT)
Age: 74
End: 2024-10-13